# Patient Record
Sex: MALE | Race: WHITE | NOT HISPANIC OR LATINO | Employment: OTHER | URBAN - METROPOLITAN AREA
[De-identification: names, ages, dates, MRNs, and addresses within clinical notes are randomized per-mention and may not be internally consistent; named-entity substitution may affect disease eponyms.]

---

## 2021-03-22 ENCOUNTER — HOSPITAL ENCOUNTER (EMERGENCY)
Facility: CLINIC | Age: 46
Discharge: HOME | End: 2021-03-22
Attending: EMERGENCY MEDICINE | Admitting: EMERGENCY MEDICINE
Payer: COMMERCIAL

## 2021-03-22 ENCOUNTER — APPOINTMENT (OUTPATIENT)
Dept: GENERAL RADIOLOGY | Facility: CLINIC | Age: 46
End: 2021-03-22
Attending: EMERGENCY MEDICINE
Payer: COMMERCIAL

## 2021-03-22 VITALS
SYSTOLIC BLOOD PRESSURE: 111 MMHG | HEIGHT: 70 IN | RESPIRATION RATE: 14 BRPM | HEART RATE: 54 BPM | TEMPERATURE: 97.7 F | OXYGEN SATURATION: 99 % | DIASTOLIC BLOOD PRESSURE: 69 MMHG | BODY MASS INDEX: 22.19 KG/M2 | WEIGHT: 155 LBS

## 2021-03-22 DIAGNOSIS — R07.89 ATYPICAL CHEST PAIN: ICD-10-CM

## 2021-03-22 LAB
ALBUMIN SERPL-MCNC: 4.3 G/DL (ref 3.4–5)
ALP SERPL-CCNC: 68 U/L (ref 40–150)
ALT SERPL W P-5'-P-CCNC: 27 U/L (ref 0–70)
ANION GAP SERPL CALCULATED.3IONS-SCNC: 3 MMOL/L (ref 3–14)
AST SERPL W P-5'-P-CCNC: 19 U/L (ref 0–45)
BASOPHILS # BLD AUTO: 0.1 10E9/L (ref 0–0.2)
BASOPHILS NFR BLD AUTO: 1.4 %
BILIRUB SERPL-MCNC: 0.7 MG/DL (ref 0.2–1.3)
BUN SERPL-MCNC: 18 MG/DL (ref 7–30)
CALCIUM SERPL-MCNC: 9.4 MG/DL (ref 8.5–10.1)
CHLORIDE SERPL-SCNC: 104 MMOL/L (ref 94–109)
CO2 SERPL-SCNC: 31 MMOL/L (ref 20–32)
CREAT SERPL-MCNC: 0.95 MG/DL (ref 0.66–1.25)
D DIMER PPP FEU-MCNC: <0.3 UG/ML FEU (ref 0–0.5)
DIFFERENTIAL METHOD BLD: NORMAL
EOSINOPHIL # BLD AUTO: 0.1 10E9/L (ref 0–0.7)
EOSINOPHIL NFR BLD AUTO: 2.4 %
ERYTHROCYTE [DISTWIDTH] IN BLOOD BY AUTOMATED COUNT: 12.4 % (ref 10–15)
GFR SERPL CREATININE-BSD FRML MDRD: >90 ML/MIN/{1.73_M2}
GLUCOSE SERPL-MCNC: 86 MG/DL (ref 70–99)
HCT VFR BLD AUTO: 44.8 % (ref 40–53)
HGB BLD-MCNC: 15.1 G/DL (ref 13.3–17.7)
IMM GRANULOCYTES # BLD: 0 10E9/L (ref 0–0.4)
IMM GRANULOCYTES NFR BLD: 0.2 %
LYMPHOCYTES # BLD AUTO: 1.5 10E9/L (ref 0.8–5.3)
LYMPHOCYTES NFR BLD AUTO: 30.9 %
MCH RBC QN AUTO: 31.6 PG (ref 26.5–33)
MCHC RBC AUTO-ENTMCNC: 33.7 G/DL (ref 31.5–36.5)
MCV RBC AUTO: 94 FL (ref 78–100)
MONOCYTES # BLD AUTO: 0.7 10E9/L (ref 0–1.3)
MONOCYTES NFR BLD AUTO: 13.4 %
NEUTROPHILS # BLD AUTO: 2.5 10E9/L (ref 1.6–8.3)
NEUTROPHILS NFR BLD AUTO: 51.7 %
NRBC # BLD AUTO: 0 10*3/UL
NRBC BLD AUTO-RTO: 0 /100
PLATELET # BLD AUTO: 210 10E9/L (ref 150–450)
POTASSIUM SERPL-SCNC: 4.2 MMOL/L (ref 3.4–5.3)
PROT SERPL-MCNC: 7.3 G/DL (ref 6.8–8.8)
RBC # BLD AUTO: 4.78 10E12/L (ref 4.4–5.9)
SODIUM SERPL-SCNC: 138 MMOL/L (ref 133–144)
TROPONIN I SERPL-MCNC: <0.015 UG/L (ref 0–0.04)
TROPONIN I SERPL-MCNC: <0.015 UG/L (ref 0–0.04)
WBC # BLD AUTO: 4.9 10E9/L (ref 4–11)

## 2021-03-22 PROCEDURE — 84484 ASSAY OF TROPONIN QUANT: CPT | Performed by: EMERGENCY MEDICINE

## 2021-03-22 PROCEDURE — 85379 FIBRIN DEGRADATION QUANT: CPT | Performed by: EMERGENCY MEDICINE

## 2021-03-22 PROCEDURE — 85025 COMPLETE CBC W/AUTO DIFF WBC: CPT | Performed by: EMERGENCY MEDICINE

## 2021-03-22 PROCEDURE — 99285 EMERGENCY DEPT VISIT HI MDM: CPT

## 2021-03-22 PROCEDURE — 93005 ELECTROCARDIOGRAM TRACING: CPT

## 2021-03-22 PROCEDURE — 250N000013 HC RX MED GY IP 250 OP 250 PS 637: Performed by: EMERGENCY MEDICINE

## 2021-03-22 PROCEDURE — 80053 COMPREHEN METABOLIC PANEL: CPT | Performed by: EMERGENCY MEDICINE

## 2021-03-22 PROCEDURE — 71046 X-RAY EXAM CHEST 2 VIEWS: CPT

## 2021-03-22 RX ORDER — ASPIRIN 81 MG/1
324 TABLET, CHEWABLE ORAL ONCE
Status: COMPLETED | OUTPATIENT
Start: 2021-03-22 | End: 2021-03-22

## 2021-03-22 RX ADMIN — ASPIRIN 81 MG CHEWABLE TABLET 324 MG: 81 TABLET CHEWABLE at 11:31

## 2021-03-22 ASSESSMENT — ENCOUNTER SYMPTOMS
FEVER: 0
SORE THROAT: 0
ABDOMINAL PAIN: 1

## 2021-03-22 ASSESSMENT — MIFFLIN-ST. JEOR: SCORE: 1584.33

## 2021-03-22 NOTE — ED PROVIDER NOTES
History   Chief Complaint:  Chest Pain       HPI   Greg Pena is a 45 year old male who presents with chest pain which began less than three hours ago.  The patient states that after getting ready for work, watching TV and drinking coffee, he began noticing sharp, stabbing, intermittent, left sided chest pain.  He has had episodes of chest pain in the mornings previously, but is usually able to take a TUMS and pass gas, then it is gone.  He notes the pain today is longer lasting, has not gone away and is most painful when laughing and inhaling.  He denies any fever, sore throat, and leg swelling.  He does endorse abdominal pain last night but it has resolved on its own.  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  He also denies any tobacco use.        HEART Score  Background  Calculates the overall risk of adverse event in patient's presenting with chest pain.  Based on 5 criteria (each assigned 0-2 points) including suspiciousness of history, EKG, age, risk factors and troponin.    Data  45 year old male  does not have a problem list on file.   reports that he has never smoked. He has never used smokeless tobacco.  family history is not on file.  Lab Results   Component Value Date    TROPI <0.015 03/22/2021     Criteria   0-2 points for each of 5 items (maximum of 10 points):  Score 0- History slightly suspicious for coronary syndrome  Score 0- EKG Normal  Score 1- Age 45 to 65 years old  Score 1- One to 2 risk factors for atherosclerotic disease  Score 0- Within normal limits for troponin levels  Interpretation  Risk of adverse outcome  Heart Score: 2  Total Score 0-3- Adverse Outcome Risk 2.5% - Supports early discharge with appropriate follow-up    Review of Systems   Constitutional: Negative for fever.   HENT: Negative for sore throat.    Cardiovascular: Positive for chest pain. Negative for leg swelling.  "  Gastrointestinal: Positive for abdominal pain (resolved).   All other systems reviewed and are negative.      Allergies:  Ciprofloxacin  Clindamycin    Medications:  No current medications.    Past Medical History:    Chronic sinusitis  Chronic constipation        Past Surgical History:    vasectomy  Hernia repair    Family History:    Father: heart bypass, CAD, hypertension    Social History:  Patient presents with a female .  Works [employer] and [employer].    Physical Exam     Patient Vitals for the past 24 hrs:   BP Temp Temp src Pulse Resp SpO2 Height Weight   03/22/21 1340 111/69 -- -- 54 -- 99 % -- --   03/22/21 1330 109/74 -- -- 56 -- 99 % -- --   03/22/21 1315 -- -- -- -- -- 99 % -- --   03/22/21 1300 113/72 -- -- 52 -- 100 % -- --   03/22/21 1245 -- -- -- -- -- 99 % -- --   03/22/21 1200 106/68 -- -- 54 14 100 % -- --   03/22/21 1130 121/78 -- -- -- -- -- -- --   03/22/21 0952 105/64 97.7  F (36.5  C) Oral 52 16 100 % 1.778 m (5' 10\") 70.3 kg (155 lb)       Physical Exam    Physical Exam   Constitutional:  Patient is oriented to person, place, and time. They appear well-developed and well-nourished.    HENT:   Mouth/Throat:   Oropharynx is clear and moist.   Eyes:    Conjunctivae normal and EOM are normal. Pupils are equal, round, and reactive to light.   Neck:    Normal range of motion.   Cardiovascular: Normal rate, regular rhythm and normal heart sounds.  Exam reveals no gallop and no friction rub.  No murmur heard.  Pulmonary/Chest:  Effort normal and breath sounds normal. Patient has no wheezes. Patient has no rales. pleuritic chest wall pain.  Not reproducible to palpation.  Abdominal:   Soft. Bowel sounds are normal. Patient exhibits no mass. There is no tenderness. There is no rebound and no guarding.   Musculoskeletal:  Normal range of motion. Patient exhibits no edema.   Neurological:   Patient is alert and oriented to person, place, and time. Patient has normal strength. No cranial " nerve deficit or sensory deficit. GCS 15  Skin:   Skin is warm and dry. No rash noted. No erythema.   Psychiatric:   Patient has a normal mood and affect. Patient's behavior is normal. Judgment and thought content normal.         Emergency Department Course   ECG:  ECG taken at 0952, ECG read at 1000  Sinus bradycardia  Otherwise normal ECG  Rate 53 bpm. WV interval 130 ms. QRS duration 82 ms. QT/QTc 410/384 ms. P-R-T axes 30 52 40.    Imaging:  Chest XR,  PA & LAT  Final Result  IMPRESSION: No focal infiltrate, pleural effusion or pneumothorax. The  cardiac and mediastinal silhouettes are within normal limits.    JULIAN SALINAS MD      Laboratory:  CMP: AWNL (Creatinine 0.95)   CBC: WBC 4.9, HGB 15.1,    Troponin (Collected 1221): <0.015  Ddimer: <0.3     Emergency Department Course:    Reviewed:  I reviewed nursing notes, vitals, past medical history and care everywhere    Assessments:  1110 I obtained history and examined the patient as noted above.   1333 I rechecked the patient and explained findings.     Interventions:  1131 Asa 324 mg PO    Disposition:  The patient was discharged to home.       Impression & Plan       CMS Diagnoses: None    Medical Decision Making:  Greg Pena is a 45 year old male who presents with chest pain.  The work up in the Emergency Department is negative.  The differential diagnosis of chest pain is broad and includes life threatening etiologies such as acute coronary syndrome, myocardial infarction, pulmonary embolism, acute aortic dissection, amongst others.  The patient's EKG was nonischemic and troponin was normal.  The chest pain symptom complex would be atypical for coronary ischemia.  The patient is low risk for PE and has a negative D-dimer and so I feel the risks of CT imaging outweighs any benefits.  Chest xray reveals no evidence of pneumonia or pneumothorax.  No serious etiology for the chest pain were detected today during this visit.  I suspect this pain is  secondary to chest wall pain.  Close follow up with primary care is indicated should the pain continue, as further work up may be performed; this was made clear to the patient, who understands.        Diagnosis:    ICD-10-CM    1. Atypical chest pain  R07.89        Scribe Disclosure:  HELEN Treasure Orlandoe, am serving as a scribe at 11:09 AM on 3/22/2021 to document services personally performed by Viviane Malave MD based on my observations and the provider's statements to me.     Viviane Oakes MD  03/22/21 0115

## 2021-05-23 ENCOUNTER — HEALTH MAINTENANCE LETTER (OUTPATIENT)
Age: 46
End: 2021-05-23

## 2021-09-12 ENCOUNTER — HEALTH MAINTENANCE LETTER (OUTPATIENT)
Age: 46
End: 2021-09-12

## 2022-06-19 ENCOUNTER — HEALTH MAINTENANCE LETTER (OUTPATIENT)
Age: 47
End: 2022-06-19

## 2022-07-03 ENCOUNTER — HOSPITAL ENCOUNTER (EMERGENCY)
Facility: CLINIC | Age: 47
Discharge: HOME | End: 2022-07-03
Attending: PHYSICIAN ASSISTANT | Admitting: PHYSICIAN ASSISTANT
Payer: COMMERCIAL

## 2022-07-03 ENCOUNTER — APPOINTMENT (OUTPATIENT)
Dept: GENERAL RADIOLOGY | Facility: CLINIC | Age: 47
End: 2022-07-03
Attending: EMERGENCY MEDICINE
Payer: COMMERCIAL

## 2022-07-03 VITALS
TEMPERATURE: 98.8 F | DIASTOLIC BLOOD PRESSURE: 50 MMHG | RESPIRATION RATE: 16 BRPM | OXYGEN SATURATION: 99 % | SYSTOLIC BLOOD PRESSURE: 108 MMHG | HEART RATE: 59 BPM | HEIGHT: 70 IN | BODY MASS INDEX: 22.33 KG/M2 | WEIGHT: 156 LBS

## 2022-07-03 DIAGNOSIS — R07.89 CHEST WALL PAIN: ICD-10-CM

## 2022-07-03 PROCEDURE — 71101 X-RAY EXAM UNILAT RIBS/CHEST: CPT | Mod: LT

## 2022-07-03 PROCEDURE — 99283 EMERGENCY DEPT VISIT LOW MDM: CPT

## 2022-07-03 PROCEDURE — 250N000013 HC RX MED GY IP 250 OP 250 PS 637: Performed by: PHYSICIAN ASSISTANT

## 2022-07-03 RX ORDER — LIDOCAINE 4 G/G
1 PATCH TOPICAL
Status: DISCONTINUED | OUTPATIENT
Start: 2022-07-03 | End: 2022-07-03 | Stop reason: HOSPADM

## 2022-07-03 RX ORDER — LIDOCAINE 4 G/G
1 PATCH TOPICAL EVERY 24 HOURS
Qty: 7 PATCH | Refills: 0 | Status: SHIPPED | OUTPATIENT
Start: 2022-07-03 | End: 2022-07-10

## 2022-07-03 RX ADMIN — LIDOCAINE 1 PATCH: 560 PATCH PERCUTANEOUS; TOPICAL; TRANSDERMAL at 21:18

## 2022-07-04 NOTE — ED PROVIDER NOTES
"  History     Chief Complaint:  Rib Pain       HPI   Greg Pena is a 46 year old male who presents with his wife to the emergency department for left lateral chest wall pain.  He reports about a week ago he fell off a ladder landing on 2 x 4 on his left chest wall.  He denies losing consciousness having headache or neck pain.  He reports his left chest wall pain improved however today he was reaching and he felt a crunch and pop and now is having pain again in his rib cage on the left side.  Denies any shortness of breath or trouble breathing.  He reports pain is worse when he takes deep breaths in.  He reports pain seems to be improving however since earlier today.  He denies any current neck head back abdominal or extremity pain from the fall 1 week ago.  Not on any blood thinners.    ROS:  Review of Systems  See HPI for pertinent positives and negatives.  Rest of ROS is negative.    Allergies:  Ciprofloxacin     Medications:    Lidocaine (LIDOCARE) 4 % Patch      Past Medical History:    History reviewed. No pertinent past medical history.  There is no problem list on file for this patient.       Past Surgical History:    Past Surgical History:   Procedure Laterality Date     HERNIA REPAIR          Family History:    family history is not on file.    Social History:  PCP: Jeaneth Blanco     Presents to the emergency department with his wife.    Physical Exam     Patient Vitals for the past 24 hrs:   BP Temp Temp src Pulse Resp SpO2 Height Weight   07/03/22 1944 108/50 -- -- -- -- -- -- --   07/03/22 1942 -- 98.8  F (37.1  C) Temporal 59 16 99 % 1.778 m (5' 10\") 70.8 kg (156 lb)        Physical Exam  General: Vitals reviewed  Head : no raccoon eyes or knight's sign.  Eyes: Nonicteric, noninjected, normal range of motion, PERRLA  Nose: Not congested, no rhinorrhea  Neck: No cervical midline tenderness.  No midline pain with full ROM.  Heart: Regular rate and rhythm without murmurs, rubs, gallops  Lungs: " Bilateral breath sounds, Clear to auscultation, no wheezing, rhonchi, Rales, crackles.  Normal respiratory excursion.  Skin: No bruising or wounds.  Neuro: Alert and oriented x3, symmetrical facial features, speech normal, bilateral upper extremity strength 5-5 with , 5-5 bilateral lower extremity strength with flexion-extension of the hips, knees, ankles.   Sensation equal and normal grossly bilaterally.    Upper extremities: No pain with palpation of bilateral shoulders, elbows, wrists with full ROM without pain.  Lower extremities: Normal ROM of the hips, knees, ankles.    Back: No thoracic lumbar midline tenderness, step-offs, bruising.  Full range of motion of the back without midline spine pain.  Chest wall: Palpation of the lower lateral left anterior chest wall is acutely tender with point tenderness.  No popping or crepitus noted.      Emergency Department Course       Imaging:  Ribs XR, unilat 3 views + PA chest,  left   Final Result   IMPRESSION: No acute left rib fracture. No pleural effusion. No pneumothorax. Tiny lucency at the left lung apex on the frontal view the chest is not visible on other views and probably a summation artifact.          Report per radiology    Laboratory:  Labs Ordered and Resulted from Time of ED Arrival to Time of ED Departure - No data to display     Procedures       Emergency Department Course:         Reviewed:  I reviewed nursing notes, vitals and past medical history    Assessments/Consults:        Interventions:  Medications   Lidocaine (LIDOCARE) 4 % Patch 1 patch (has no administration in time range)     And   lidocaine patch in PLACE (has no administration in time range)        Disposition:  The patient was discharged to home.     Impression & Plan    CMS Diagnoses: None    Medical Decision Making:    This is a 46-year-old male that presents to the emergency department for concerns of potential left sided rib fracture.  After careful review of his history present  illness, physical exam findings, vital signs and x-ray imaging today my impression diagnosis is below.  X-ray imaging showed no fracture, pneumothorax, effusion.  Clinical exam is consistent with potential minor fracture versus rib contusion.  I suspect based on his history of present illness and mechanism of injury that he probably has an underlying rib fracture.  Patient does not want any strong narcotic pain control but has excepted lidocaine patches prescription and will utilize over-the-counter anti-inflammatories.  He is currently not hypoxic or tachycardic and has full breath sounds.  He is not in respiratory distress.  I feel he is safe to discharge home with pain control and follow-up with primary care as needed in 1 week.  He should return back to the emergency department if he develops any worsening in his breathing or increasing pain or worsening condition.    My  impression of today's diagnosis is:     ICD-10-CM    1. Chest wall pain  R07.89            Discharge Medications:  New Prescriptions    LIDOCAINE (LIDOCARE) 4 % PATCH    Place 1 patch onto the skin every 24 hours for 7 days To prevent lidocaine toxicity, patient should be patch free for 12 hrs daily.        7/3/2022   Sameer Tena PA-C Kruger, Jacob C, PA-C  07/03/22 2239

## 2022-07-04 NOTE — ED TRIAGE NOTES
"Pt fell five feet off step ladder one week ago - today, went to go reach something with left arm and felt a \"crunch\" to left rib region. Pain with inspiration and movement     Triage Assessment     Row Name 07/03/22 1942       Respiratory WDL    Respiratory WDL WDL       Cardiac WDL    Cardiac WDL WDL       Cognitive/Neuro/Behavioral WDL    Cognitive/Neuro/Behavioral WDL WDL              "

## 2022-09-14 ENCOUNTER — HOSPITAL ENCOUNTER (EMERGENCY)
Facility: CLINIC | Age: 47
Discharge: HOME | End: 2022-09-14
Admitting: EMERGENCY MEDICINE
Payer: COMMERCIAL

## 2022-09-14 PROCEDURE — 99282 EMERGENCY DEPT VISIT SF MDM: CPT

## 2022-09-14 NOTE — ED TRIAGE NOTES
[place] [facility] [place] [facility]. Pt states he was hit in the right eye by a piece of fiberglass leo on Monday, started seeing floaters right away. Pt is now stating he is seeing floaters, flashes of light and spaghetti looking objects through right eye. Pt states soreness 2/10.      Triage Assessment     Row Name 09/14/22 2670       Triage Assessment (Adult)    Airway WDL WDL       Respiratory WDL    Respiratory WDL WDL       Skin Circulation/Temperature WDL    Skin Circulation/Temperature WDL WDL       Cardiac WDL    Cardiac WDL WDL       Peripheral/Neurovascular WDL    Peripheral Neurovascular WDL WDL       Cognitive/Neuro/Behavioral WDL    Cognitive/Neuro/Behavioral WDL WDL

## 2022-09-14 NOTE — ED NOTES
Rapid Assessment Note    History:   Greg Pena is a 46 year old male who presents with right eye pain and vision disturbances since he was hit in the eye with a piece of fiberglass roof shingle on Monday. He notes that his vision is getting worse with blurry spots and black spots. He did not try to wash his eye out after this happened and has only been using eye drops since. He was seen at  today and was sent here as they did not have the equipment to dilate his eye to rule out a retinal detachment.    Exam:   General:  Alert, interactive  Cardiovascular:  Well perfused  Lungs:  No respiratory distress, no accessory muscle use  Neuro:  Moving all 4 extremities  Skin:  Warm, dry  Psych:  Normal affect    Plan of Care:   I evaluated the patient and developed an initial plan of care. I discussed this plan and explained that I, or one of my partners, would be returning to complete the evaluation.     I, Swapnil Timoteo, am serving as a scribe to document services personally performed by Saúl Soto MD based on my observations and the provider's statements to me.    09/14/2022  [employer] [facility]    Portions of this medical record were completed by a scribe. UPON MY REVIEW AND AUTHENTICATION BY ELECTRONIC SIGNATURE, this confirms (a) I performed the applicable clinical services, and (b) the record is accurate.      Saúl Soto MD  09/14/22 2020

## 2022-11-19 ENCOUNTER — HEALTH MAINTENANCE LETTER (OUTPATIENT)
Age: 47
End: 2022-11-19

## 2023-07-01 ENCOUNTER — HEALTH MAINTENANCE LETTER (OUTPATIENT)
Age: 48
End: 2023-07-01

## 2024-08-24 ENCOUNTER — HEALTH MAINTENANCE LETTER (OUTPATIENT)
Age: 49
End: 2024-08-24